# Patient Record
Sex: MALE | URBAN - METROPOLITAN AREA
[De-identification: names, ages, dates, MRNs, and addresses within clinical notes are randomized per-mention and may not be internally consistent; named-entity substitution may affect disease eponyms.]

---

## 2017-11-30 ENCOUNTER — APPOINTMENT (RX ONLY)
Dept: URBAN - METROPOLITAN AREA CLINIC 126 | Facility: CLINIC | Age: 82
Setting detail: DERMATOLOGY
End: 2017-11-30

## 2017-11-30 DIAGNOSIS — Z85.820 PERSONAL HISTORY OF MALIGNANT MELANOMA OF SKIN: ICD-10-CM

## 2017-11-30 DIAGNOSIS — L81.4 OTHER MELANIN HYPERPIGMENTATION: ICD-10-CM

## 2017-11-30 DIAGNOSIS — D485 NEOPLASM OF UNCERTAIN BEHAVIOR OF SKIN: ICD-10-CM

## 2017-11-30 DIAGNOSIS — D18.0 HEMANGIOMA: ICD-10-CM

## 2017-11-30 DIAGNOSIS — L57.0 ACTINIC KERATOSIS: ICD-10-CM

## 2017-11-30 PROBLEM — I25.10 ATHEROSCLEROTIC HEART DISEASE OF NATIVE CORONARY ARTERY WITHOUT ANGINA PECTORIS: Status: ACTIVE | Noted: 2017-11-30

## 2017-11-30 PROBLEM — L23.7 ALLERGIC CONTACT DERMATITIS DUE TO PLANTS, EXCEPT FOOD: Status: ACTIVE | Noted: 2017-11-30

## 2017-11-30 PROBLEM — L55.1 SUNBURN OF SECOND DEGREE: Status: ACTIVE | Noted: 2017-11-30

## 2017-11-30 PROBLEM — D18.01 HEMANGIOMA OF SKIN AND SUBCUTANEOUS TISSUE: Status: ACTIVE | Noted: 2017-11-30

## 2017-11-30 PROBLEM — D48.5 NEOPLASM OF UNCERTAIN BEHAVIOR OF SKIN: Status: ACTIVE | Noted: 2017-11-30

## 2017-11-30 PROBLEM — K21.9 GASTRO-ESOPHAGEAL REFLUX DISEASE WITHOUT ESOPHAGITIS: Status: ACTIVE | Noted: 2017-11-30

## 2017-11-30 PROCEDURE — ? LIQUID NITROGEN

## 2017-11-30 PROCEDURE — 99203 OFFICE O/P NEW LOW 30 MIN: CPT | Mod: 25

## 2017-11-30 PROCEDURE — ? COUNSELING

## 2017-11-30 PROCEDURE — 17000 DESTRUCT PREMALG LESION: CPT

## 2017-11-30 PROCEDURE — 11101: CPT

## 2017-11-30 PROCEDURE — ? BIOPSY BY SHAVE METHOD

## 2017-11-30 PROCEDURE — 17003 DESTRUCT PREMALG LES 2-14: CPT

## 2017-11-30 PROCEDURE — ? SHAVE REMOVAL

## 2017-11-30 PROCEDURE — 11100: CPT | Mod: 59

## 2017-11-30 PROCEDURE — 69100 BIOPSY OF EXTERNAL EAR: CPT | Mod: 59

## 2017-11-30 PROCEDURE — 11307 SHAVE SKIN LESION 1.1-2.0 CM: CPT | Mod: 59

## 2017-11-30 ASSESSMENT — LOCATION SIMPLE DESCRIPTION DERM
LOCATION SIMPLE: RIGHT FOREHEAD
LOCATION SIMPLE: RIGHT TEMPLE
LOCATION SIMPLE: POSTERIOR NECK
LOCATION SIMPLE: LEFT EAR
LOCATION SIMPLE: RIGHT LOWER BACK
LOCATION SIMPLE: RIGHT CHEEK
LOCATION SIMPLE: LEFT FOREHEAD
LOCATION SIMPLE: ABDOMEN
LOCATION SIMPLE: LEFT CHEEK
LOCATION SIMPLE: RIGHT UPPER ARM
LOCATION SIMPLE: LEFT UPPER ARM
LOCATION SIMPLE: LEFT UPPER BACK

## 2017-11-30 ASSESSMENT — LOCATION DETAILED DESCRIPTION DERM
LOCATION DETAILED: LEFT POSTERIOR NECK
LOCATION DETAILED: RIGHT DISTAL POSTERIOR UPPER ARM
LOCATION DETAILED: LEFT SUPERIOR LATERAL MALAR CHEEK
LOCATION DETAILED: PERIUMBILICAL SKIN
LOCATION DETAILED: LEFT INFERIOR POSTERIOR NECK
LOCATION DETAILED: RIGHT INFERIOR LATERAL FOREHEAD
LOCATION DETAILED: RIGHT INFERIOR MEDIAL MIDBACK
LOCATION DETAILED: LEFT MEDIAL UPPER BACK
LOCATION DETAILED: RIGHT FOREHEAD
LOCATION DETAILED: LEFT SUPERIOR MEDIAL FOREHEAD
LOCATION DETAILED: LEFT INFERIOR LATERAL FOREHEAD
LOCATION DETAILED: RIGHT MID TEMPLE
LOCATION DETAILED: LEFT SUPERIOR CRUS OF ANTIHELIX
LOCATION DETAILED: LEFT DISTAL POSTERIOR UPPER ARM
LOCATION DETAILED: RIGHT SUPERIOR LATERAL MALAR CHEEK

## 2017-11-30 ASSESSMENT — LOCATION ZONE DERM
LOCATION ZONE: EAR
LOCATION ZONE: NECK
LOCATION ZONE: TRUNK
LOCATION ZONE: ARM
LOCATION ZONE: FACE

## 2017-11-30 NOTE — PROCEDURE: MIPS QUALITY
Quality 111:Pneumonia Vaccination Status For Older Adults: Pneumococcal Vaccination not Administered or Previously Received, Reason not Otherwise Specified
Quality 130: Documentation Of Current Medications In The Medical Record: Current Medications Documented
Detail Level: Simple
Quality 110: Preventive Care And Screening: Influenza Immunization: Influenza Immunization previously received during influenza season
Quality 47: Advance Care Plan: Advance Care Planning discussed and documented in the medical record; patient did not wish or was not able to name a surrogate decision maker or provide an advance care plan.
Additional Notes: PQRS #47: It is the patients' responsibility to provide copies of the documents to the front staff to have scanned into their charts. \\nPQRS #110 and #111: Aria advises you to speak with your primary care physician in regards to flu shots and/or pneumonia vaccinations. \\nPQRS #226: Ariase advises you to reach out for support from the Centers for Disease Control and Prevention (CDC) on providing resources for smoking cessation programs or speak with your primary care physician. \\nPQRS #131: Ariase advises you to speak to your primary care provider for further evaluation.
Quality 131: Pain Assessment And Follow-Up: Pain assessment using a standardized tool is documented as negative, no follow-up plan required

## 2017-11-30 NOTE — PROCEDURE: LIQUID NITROGEN
Post-Care Instructions: After care instructions were reviewed with the patient & the handout was given to the patient.
Duration Of Freeze Thaw-Cycle (Seconds): 0
Render Post-Care Instructions In Note?: no
Detail Level: Simple
Consent: Risks & side effects were explained including redness, blistering, hypopigmentation, hyperpigmentation, scar, infection, & recurrence. Post-procedure instruction hand-out was given to the patient.

## 2017-11-30 NOTE — PROCEDURE: SHAVE REMOVAL
Bill For Surgical Tray: no
Render Post-Care Instructions In Note?: yes
Hemostasis: Aluminum Chloride
Medical Necessity Information: It is in your best interest to select a reason for this procedure from the list below. All of these items fulfill various CMS LCD requirements except the new and changing color options.
Detail Level: Detailed
Wound Care: Petrolatum
Anesthesia Volume In Cc: 0.5
Consent was obtained from the patient. The risks and benefits to therapy were discussed in detail. Specifically, the risks of infection, scarring, bleeding, prolonged wound healing, incomplete removal, allergy to anesthesia, nerve injury and recurrence were addressed. Prior to the procedure, the treatment site was clearly identified and confirmed by the patient. All components of Universal Protocol completed.
Post-Care Instructions: I reviewed with the patient in detail post-care instructions. Patient is to keep the biopsy site dry overnight, and then apply polysporin twice daily until healed, covered with a bandage.
X Size Of Lesion In Cm (Optional): 0
Biopsy Method: Personna blade
Notification Instructions: Patient will be notified of biopsy results. However, patient instructed to call the office if not contacted within 2 weeks.
Lab Facility: [address]
Lab: [facility]
Path Notes (To The Dermatopathologist): Please check margins.
Size Of Lesion In Cm (Required): 1.2
Billing Type: [employer]
Body Location Override (Optional - Billing Will Still Be Based On Selected Body Map Location If Applicable): Left Lower Neck
Anesthesia Type: 1% lidocaine with epinephrine

## 2017-11-30 NOTE — PROCEDURE: BIOPSY BY SHAVE METHOD
Dressing: bandage
Lab Facility: [address]
Render Post-Care Instructions In Note?: yes
Biopsy Method: Double edge Personna blades
Size Of Lesion In Cm: 0.7
Silver Nitrate Text: The wound bed was treated with silver nitrate after the biopsy was performed.
Biopsy Type: H and E
Post-Care Instructions: Post procedure handout was reviewed and given to the patient.
Body Location Override (Optional - Billing Will Still Be Based On Selected Body Map Location If Applicable): Left Ear
Lab: [facility]
Bill For Surgical Tray: no
Additional Anesthesia Volume In Cc (Will Not Render If 0): 0
Hemostasis: Aluminum Chloride
Cryotherapy Text: The wound bed was treated with cryotherapy after the biopsy was performed.
Type Of Destruction Used: Curettage
Curettage Text: The wound bed was treated with curettage after the biopsy was performed.
Anesthesia Volume In Cc (Will Not Render If 0): 0.5
Anesthesia Type: 1% lidocaine with epinephrine
Billing Type: [employer]
Electrodesiccation And Curettage Text: The wound bed was treated with electrodesiccation and curettage after the biopsy was performed.
Notification Instructions: Patient will be notified of biopsy results. However, patient instructed to call the office if not contacted within 2 weeks.
Detail Level: Simple
Electrodesiccation Text: The wound bed was treated with electrodesiccation after the biopsy was performed.
Consent: Written consent was obtained and risks were reviewed including but not limited to scarring, infection, bleeding, scabbing, incomplete removal, nerve damage and allergy to anesthesia.
Wound Care: Vaseline
Size Of Lesion In Cm: 0.6
Billing Type: Third-Party Bill
Lab: [id number]
Lab Facility: [id number]
Body Location Override (Optional - Billing Will Still Be Based On Selected Body Map Location If Applicable): Right Epigastrium
Body Location Override (Optional - Billing Will Still Be Based On Selected Body Map Location If Applicable): Left Upper Neck

## 2018-01-15 ENCOUNTER — APPOINTMENT (RX ONLY)
Dept: URBAN - METROPOLITAN AREA CLINIC 126 | Facility: CLINIC | Age: 83
Setting detail: DERMATOLOGY
End: 2018-01-15

## 2018-01-15 DIAGNOSIS — L90.5 SCAR CONDITIONS AND FIBROSIS OF SKIN: ICD-10-CM

## 2018-01-15 DIAGNOSIS — L57.0 ACTINIC KERATOSIS: ICD-10-CM

## 2018-01-15 PROCEDURE — 99212 OFFICE O/P EST SF 10 MIN: CPT | Mod: 25

## 2018-01-15 PROCEDURE — 17000 DESTRUCT PREMALG LESION: CPT

## 2018-01-15 PROCEDURE — ? LIQUID NITROGEN

## 2018-01-15 PROCEDURE — ? COUNSELING

## 2018-01-15 PROCEDURE — ? DIAGNOSIS COMMENT

## 2018-01-15 ASSESSMENT — LOCATION DETAILED DESCRIPTION DERM
LOCATION DETAILED: LEFT SUPERIOR LATERAL NECK
LOCATION DETAILED: LEFT ANTIHELIX

## 2018-01-15 ASSESSMENT — LOCATION ZONE DERM
LOCATION ZONE: NECK
LOCATION ZONE: EAR

## 2018-01-15 ASSESSMENT — LOCATION SIMPLE DESCRIPTION DERM
LOCATION SIMPLE: LEFT EAR
LOCATION SIMPLE: LEFT ANTERIOR NECK

## 2018-01-15 NOTE — PROCEDURE: LIQUID NITROGEN
Duration Of Freeze Thaw-Cycle (Seconds): 0
Render Post-Care Instructions In Note?: no
Detail Level: Simple
Consent: Risks & side effects were explained including redness, blistering, hypopigmentation, hyperpigmentation, scar, infection, & recurrence. Post-procedure instruction hand-out was given to the patient.
Post-Care Instructions: After care instructions were reviewed with the patient & the handout was given to the patient.

## 2020-01-09 NOTE — PATIENT DISCUSSION
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 I recommended the patient adequately protect their eyes from excessive UV light and dry, vasquez conditions. The use of artificial tears in dry conditions was encouraged. Return for follow-up as scheduled.

## 2020-01-09 NOTE — PATIENT DISCUSSION
JONATHAN OU:  PRESCRIBE ARTIFICIAL TEARS BID - QID. DECREASE OUTDOOR EXPOSURE AND USE UV PROTECTION/ SUNGLASSES WITH OUTDOOR ACTIVITIES. RETURN FOR FOLLOW UP AS SCHEDULED.

## 2021-01-05 NOTE — PATIENT DISCUSSION
CONJUNCTIVOCHALASIS, OU:  RECOMMEND CONTINUING GOOD QUALITY ARTIFICIAL TEARS. RETURN FOR FOLLOW-UP AS SCHEDULED.

## 2021-01-05 NOTE — PATIENT DISCUSSION
NITAGUECULA, OU:  CONTINUE ARTIFICIAL TEARS BID - QID. DECREASE OUTDOOR EXPOSURE AND USE UV PROTECTION/ SUNGLASSES WITH OUTDOOR ACTIVITIES. RETURN FOR FOLLOW UP AS SCHEDULED.

## 2021-01-05 NOTE — PATIENT DISCUSSION
GLAUCOMA (SUSPECT) OU :  CD ASYMMETRY. STABLE ONH OCT WITHOUT THINNING AND NORMAL IOP TODAY OU. RETURN FOR FOLLOW UP AS SCHEDULED.

## 2021-03-22 ENCOUNTER — NEW PATIENT COMPREHENSIVE (OUTPATIENT)
Dept: URBAN - METROPOLITAN AREA CLINIC 32 | Facility: CLINIC | Age: 86
End: 2021-03-22

## 2021-03-22 DIAGNOSIS — H04.123: ICD-10-CM

## 2021-03-22 DIAGNOSIS — H35.373: ICD-10-CM

## 2021-03-22 PROCEDURE — 92004 COMPRE OPH EXAM NEW PT 1/>: CPT

## 2021-03-22 PROCEDURE — 92015 DETERMINE REFRACTIVE STATE: CPT

## 2021-03-22 PROCEDURE — 92134 CPTRZ OPH DX IMG PST SGM RTA: CPT

## 2021-03-22 ASSESSMENT — VISUAL ACUITY
OS_CC: 20/50
OD_CC: 20/50
OD_SC: 20/60
OD_SC: J7
OD_GLARE: 20/100
OS_SC: J16
OS_SC: 20/60
OD_CC: J1
OS_CC: J3
OS_GLARE: 20/100

## 2021-03-22 ASSESSMENT — KERATOMETRY
OS_AXISANGLE2_DEGREES: 105
OD_K1POWER_DIOPTERS: 45.00
OS_AXISANGLE_DEGREES: 15
OD_AXISANGLE2_DEGREES: 15
OD_K2POWER_DIOPTERS: 44.75
OS_K1POWER_DIOPTERS: 44.50
OD_AXISANGLE_DEGREES: 105
OS_K2POWER_DIOPTERS: 43.50

## 2021-03-22 ASSESSMENT — TONOMETRY
OD_IOP_MMHG: 10
OS_IOP_MMHG: 10

## 2021-10-11 NOTE — PATIENT DISCUSSION
Advised patient to wear sunglasses to avoid progression. Use artificial tears as needed for comfort.

## 2022-12-06 ENCOUNTER — COMPREHENSIVE EXAM (OUTPATIENT)
Dept: URBAN - METROPOLITAN AREA CLINIC 32 | Facility: CLINIC | Age: 87
End: 2022-12-06

## 2022-12-06 PROCEDURE — 92134 CPTRZ OPH DX IMG PST SGM RTA: CPT

## 2022-12-06 PROCEDURE — 92015 DETERMINE REFRACTIVE STATE: CPT

## 2022-12-06 PROCEDURE — 92014 COMPRE OPH EXAM EST PT 1/>: CPT

## 2022-12-06 ASSESSMENT — KERATOMETRY
OD_K2POWER_DIOPTERS: 45.00
OD_AXISANGLE_DEGREES: 112
OD_AXISANGLE2_DEGREES: 22
OS_K1POWER_DIOPTERS: 44.25
OS_K2POWER_DIOPTERS: 43.00
OS_AXISANGLE_DEGREES: 30
OS_AXISANGLE2_DEGREES: 120
OD_K1POWER_DIOPTERS: 45.25

## 2022-12-06 ASSESSMENT — TONOMETRY
OS_IOP_MMHG: 11
OD_IOP_MMHG: 9

## 2022-12-06 ASSESSMENT — VISUAL ACUITY
OS_SC: 20/30-2
OD_CC: J1-1
OD_SC: 20/30

## 2023-12-15 ENCOUNTER — EMERGENCY VISIT (OUTPATIENT)
Dept: URBAN - METROPOLITAN AREA CLINIC 32 | Facility: CLINIC | Age: 88
End: 2023-12-15

## 2023-12-15 DIAGNOSIS — B00.52: ICD-10-CM

## 2023-12-15 PROCEDURE — 99212 OFFICE O/P EST SF 10 MIN: CPT

## 2023-12-15 RX ORDER — GANCICLOVIR 1.5 MG/G: 1 GEL OPHTHALMIC

## 2023-12-15 ASSESSMENT — KERATOMETRY
OD_AXISANGLE2_DEGREES: 22
OD_AXISANGLE_DEGREES: 112
OS_AXISANGLE2_DEGREES: 120
OD_K2POWER_DIOPTERS: 45.00
OD_K1POWER_DIOPTERS: 45.25
OS_AXISANGLE_DEGREES: 30
OS_K2POWER_DIOPTERS: 43.00
OS_K1POWER_DIOPTERS: 44.25

## 2023-12-15 ASSESSMENT — VISUAL ACUITY
OS_SC: 20/30
OD_SC: 20/30

## 2023-12-16 ENCOUNTER — EMERGENCY VISIT (OUTPATIENT)
Dept: URBAN - METROPOLITAN AREA CLINIC 32 | Facility: CLINIC | Age: 88
End: 2023-12-16

## 2023-12-16 DIAGNOSIS — B00.52: ICD-10-CM

## 2023-12-16 PROCEDURE — 99213 OFFICE O/P EST LOW 20 MIN: CPT

## 2023-12-16 ASSESSMENT — KERATOMETRY
OD_K1POWER_DIOPTERS: 45.25
OS_AXISANGLE_DEGREES: 30
OD_K2POWER_DIOPTERS: 45.00
OD_AXISANGLE_DEGREES: 112
OS_K2POWER_DIOPTERS: 43.00
OS_AXISANGLE2_DEGREES: 120
OS_K1POWER_DIOPTERS: 44.25
OD_AXISANGLE2_DEGREES: 22

## 2023-12-16 ASSESSMENT — VISUAL ACUITY
OS_SC: 20/60
OS_PH: 20/50

## 2023-12-21 ENCOUNTER — FOLLOW UP (OUTPATIENT)
Dept: URBAN - METROPOLITAN AREA CLINIC 32 | Facility: CLINIC | Age: 88
End: 2023-12-21

## 2023-12-21 DIAGNOSIS — B00.52: ICD-10-CM

## 2023-12-21 PROCEDURE — 99212 OFFICE O/P EST SF 10 MIN: CPT

## 2023-12-21 ASSESSMENT — VISUAL ACUITY: OS_SC: 20/30+2

## 2023-12-21 ASSESSMENT — KERATOMETRY
OD_K2POWER_DIOPTERS: 45.00
OD_AXISANGLE2_DEGREES: 22
OS_AXISANGLE_DEGREES: 30
OD_AXISANGLE_DEGREES: 112
OS_K2POWER_DIOPTERS: 43.00
OS_K1POWER_DIOPTERS: 44.25
OS_AXISANGLE2_DEGREES: 120
OD_K1POWER_DIOPTERS: 45.25

## 2024-03-26 ENCOUNTER — COMPREHENSIVE EXAM (OUTPATIENT)
Dept: URBAN - METROPOLITAN AREA CLINIC 32 | Facility: CLINIC | Age: 89
End: 2024-03-26

## 2024-03-26 DIAGNOSIS — H43.813: ICD-10-CM

## 2024-03-26 DIAGNOSIS — H04.123: ICD-10-CM

## 2024-03-26 DIAGNOSIS — H35.373: ICD-10-CM

## 2024-03-26 PROCEDURE — 99214 OFFICE O/P EST MOD 30 MIN: CPT

## 2024-03-26 PROCEDURE — 92134 CPTRZ OPH DX IMG PST SGM RTA: CPT

## 2024-03-26 PROCEDURE — 92015 DETERMINE REFRACTIVE STATE: CPT

## 2024-03-26 ASSESSMENT — KERATOMETRY
OS_AXISANGLE_DEGREES: 42
OS_AXISANGLE2_DEGREES: 132
OD_K2POWER_DIOPTERS: 44.75
OS_K1POWER_DIOPTERS: 44.75
OS_K2POWER_DIOPTERS: 43.50
OD_K1POWER_DIOPTERS: 45.25
OD_AXISANGLE2_DEGREES: 19
OD_AXISANGLE_DEGREES: 109

## 2024-03-26 ASSESSMENT — VISUAL ACUITY
OS_GLARE: 20/60
OS_CC: J5+2
OS_SC: J16
OD_CC: J1
OD_SC: 20/30-1
OD_CC: 20/30+2
OD_GLARE: 20/50
OS_CC: 20/40
OD_SC: J10
OS_SC: 20/50

## 2024-03-26 ASSESSMENT — TONOMETRY
OD_IOP_MMHG: 9
OS_IOP_MMHG: 12

## 2024-05-21 ENCOUNTER — EMERGENCY VISIT (OUTPATIENT)
Dept: URBAN - METROPOLITAN AREA CLINIC 32 | Facility: CLINIC | Age: 89
End: 2024-05-21

## 2024-05-21 DIAGNOSIS — H35.373: ICD-10-CM

## 2024-05-21 DIAGNOSIS — H43.813: ICD-10-CM

## 2024-05-21 DIAGNOSIS — H04.123: ICD-10-CM

## 2024-05-21 PROCEDURE — 99213 OFFICE O/P EST LOW 20 MIN: CPT

## 2024-05-21 ASSESSMENT — VISUAL ACUITY
OS_CC: 20/25-1
OD_CC: 20/25-1

## 2024-05-21 ASSESSMENT — KERATOMETRY
OD_AXISANGLE_DEGREES: 109
OD_K2POWER_DIOPTERS: 44.75
OS_AXISANGLE_DEGREES: 42
OS_K1POWER_DIOPTERS: 44.75
OD_AXISANGLE2_DEGREES: 19
OD_K1POWER_DIOPTERS: 45.25
OS_AXISANGLE2_DEGREES: 132
OS_K2POWER_DIOPTERS: 43.50

## 2024-05-21 ASSESSMENT — TONOMETRY
OD_IOP_MMHG: 10
OS_IOP_MMHG: 11

## 2025-03-27 ENCOUNTER — COMPREHENSIVE EXAM (OUTPATIENT)
Age: 89
End: 2025-03-27

## 2025-03-27 DIAGNOSIS — H35.373: ICD-10-CM

## 2025-03-27 DIAGNOSIS — D31.32: ICD-10-CM

## 2025-03-27 DIAGNOSIS — H43.813: ICD-10-CM

## 2025-03-27 DIAGNOSIS — H04.123: ICD-10-CM

## 2025-03-27 PROCEDURE — 99214 OFFICE O/P EST MOD 30 MIN: CPT

## 2025-03-27 PROCEDURE — 92134 CPTRZ OPH DX IMG PST SGM RTA: CPT
